# Patient Record
Sex: FEMALE | Race: WHITE | NOT HISPANIC OR LATINO | URBAN - METROPOLITAN AREA
[De-identification: names, ages, dates, MRNs, and addresses within clinical notes are randomized per-mention and may not be internally consistent; named-entity substitution may affect disease eponyms.]

---

## 2017-01-01 ENCOUNTER — HOME CARE/HOSPICE - HEALTHEAST (OUTPATIENT)
Dept: HOME HEALTH SERVICES | Facility: HOME HEALTH | Age: 37
End: 2017-01-01

## 2017-02-08 ENCOUNTER — OFFICE VISIT - HEALTHEAST (OUTPATIENT)
Dept: MIDWIFE SERVICES | Facility: CLINIC | Age: 37
End: 2017-02-08

## 2017-02-08 DIAGNOSIS — E03.9 HYPOTHYROID: ICD-10-CM

## 2017-02-08 ASSESSMENT — MIFFLIN-ST. JEOR: SCORE: 1489.72

## 2017-02-10 ENCOUNTER — AMBULATORY - HEALTHEAST (OUTPATIENT)
Dept: MIDWIFE SERVICES | Facility: CLINIC | Age: 37
End: 2017-02-10

## 2017-02-10 DIAGNOSIS — E03.9 HYPOTHYROID: ICD-10-CM

## 2017-02-13 LAB
HPV INTERPRETATION - HISTORICAL: NORMAL
HPV INTERPRETER - HISTORICAL: NORMAL

## 2017-02-14 LAB
BKR LAB AP ABNORMAL BLEEDING: NO
BKR LAB AP BIRTH CONTROL/HORMONES: NORMAL
BKR LAB AP CERVICAL APPEARANCE: NORMAL
BKR LAB AP GYN ADEQUACY: NORMAL
BKR LAB AP GYN INTERPRETATION: NORMAL
BKR LAB AP HPV REFLEX: NORMAL
BKR LAB AP LMP: NORMAL
BKR LAB AP PATIENT STATUS: NORMAL
BKR LAB AP PREVIOUS ABNORMAL: NO
BKR LAB AP PREVIOUS NORMAL: NORMAL
HIGH RISK?: NO
PATH REPORT.COMMENTS IMP SPEC: NORMAL
RESULT FLAG (HE HISTORICAL CONVERSION): NORMAL

## 2017-02-15 ENCOUNTER — COMMUNICATION - HEALTHEAST (OUTPATIENT)
Dept: MIDWIFE SERVICES | Facility: CLINIC | Age: 37
End: 2017-02-15

## 2017-02-20 ENCOUNTER — AMBULATORY - HEALTHEAST (OUTPATIENT)
Dept: MIDWIFE SERVICES | Facility: CLINIC | Age: 37
End: 2017-02-20

## 2017-02-20 ENCOUNTER — COMMUNICATION - HEALTHEAST (OUTPATIENT)
Dept: ADMINISTRATIVE | Facility: CLINIC | Age: 37
End: 2017-02-20

## 2017-02-20 DIAGNOSIS — E03.9 HYPOTHYROID: ICD-10-CM

## 2017-02-20 DIAGNOSIS — O09.522 SUPERVISION OF HIGH RISK ELDERLY MULTIGRAVIDA IN SECOND TRIMESTER: ICD-10-CM

## 2017-02-20 RX ORDER — LEVOTHYROXINE SODIUM 75 UG/1
75 TABLET ORAL DAILY
Qty: 30 TABLET | Refills: 12 | Status: SHIPPED | OUTPATIENT
Start: 2017-02-20

## 2018-03-28 ENCOUNTER — OFFICE VISIT - HEALTHEAST (OUTPATIENT)
Dept: CARDIOLOGY | Facility: CLINIC | Age: 38
End: 2018-03-28

## 2018-03-28 DIAGNOSIS — I30.0 IDIOPATHIC PERICARDITIS: ICD-10-CM

## 2018-03-28 ASSESSMENT — MIFFLIN-ST. JEOR: SCORE: 1498.22

## 2018-03-29 ENCOUNTER — COMMUNICATION - HEALTHEAST (OUTPATIENT)
Dept: CARDIOLOGY | Facility: CLINIC | Age: 38
End: 2018-03-29

## 2018-03-29 DIAGNOSIS — I30.0 IDIOPATHIC PERICARDITIS: ICD-10-CM

## 2018-03-29 RX ORDER — COLCHICINE 0.6 MG/1
0.6 CAPSULE ORAL 2 TIMES DAILY
Qty: 60 CAPSULE | Refills: 1 | Status: SHIPPED | OUTPATIENT
Start: 2018-03-29

## 2018-04-11 ENCOUNTER — COMMUNICATION - HEALTHEAST (OUTPATIENT)
Dept: CARDIOLOGY | Facility: CLINIC | Age: 38
End: 2018-04-11

## 2021-05-30 VITALS — WEIGHT: 172 LBS | BODY MASS INDEX: 26.07 KG/M2 | HEIGHT: 68 IN

## 2021-06-01 VITALS — BODY MASS INDEX: 26.22 KG/M2 | WEIGHT: 173 LBS | HEIGHT: 68 IN

## 2021-06-08 NOTE — PROGRESS NOTES
INES@Patient ID: Ashley Ferrer is a 37 y.o. female.  Assessment:   Normal postpartum exam at ~6 weeks postpartum.   lactating and bottlefeeding formula    Plan:    1. Pap smear done at today's visit as there were no pap results in her prenatal record from her previous clinic.  Given option to obtain pap--pt accepts.  Due for annual Gyn exam in February 2017.  Pt prefers phone call with results.  2.  Thyroid Hooker today   3. Desired contraception: Natural Family planning.   4. Discussed resumption of exercise and setting a goal to return to pre-pregnancy weight in the next 6-12 months.   5.  Resumption of intercourse reviewed with possible changes in libido and vaginal lubrication while nursing.  6.  Nutrition and supplements reviewed.  Advised continuation of a prenatal or multivitamin, also Vitamin D3 5000 IU geltab daily and an omega 3 fatty acid supplement.  7. Adjustment to parenting, self care and open communication with her support system discussed. Warning signs and symptoms related to postpartum mood disorders reviewed.     Total time spent with patient 40 minutes, >50% counseling, education and coordination of care.    Subjective:     Ashley Ferrer is a 37 y.o. female who presents for postpartum visit. She is 6 weeks postpartum following a NSVB.  I have fully reviewed the prenatal and intrapartum course. The delivery was at 39w6d gestation Her baby boy is named Bret and weighed 9 lbs 5 oz at birth.     Postpartum course has been stable. Baby's course has been stable. Baby is feeding by both breast and bottle.  Lochia ceased at 3 weeks postpartum.  Bowel function is normal. Bladder function is normal. She has not resumed intercourse. Desired contraception: Natural Family Planning. [place] postpartum depression screening score: 3, no on #10, which she feels reflects the recent stress of her household having the stomach virus. She has resumed regular exercise. Patient is a  JOSE Ramirez requests thyroid labs today.  She plans f/u with her primary provider to manage her levothyroxine.      Review of Systems  General:  Denies problem  Eyes: Denies problem  Ears/Nose/Throat: Denies problem  Cardiovascular: Denies problem  Respiratory:  Denies problem  Gastrointestinal:  Denies problem,   Genitourinary: Denies problem  Musculoskeletal:  Denies problem  Skin: Denies problem  Neurologic: Denies problem  Psychiatric: Denies Problem  Endocrine: Denies problem    Objective:         Physical Exam:  General Appearance: Alert, cooperative, no distress, appears stated age  Skin: Skin color, texture, turgor normal, no rashes or lesions  Throat: Lips, mucosa, and tongue normal; teeth and gums normal  HEENT: grossly normal; otoscopic and opthalmic exam not performed.   Neck: Supple, symmetrical, trachea midline, no adenopathy;  thyroid: not enlarged, symmetric, no tenderness/mass/nodules  Lungs: Clear to auscultation bilaterally, respirations unlabored  Breasts: No breast masses, tenderness, asymmetry, or nipple discharge.  Heart: Regular rate and rhythm, S1 and  S2 normal, no murmur, rub, or gallop  Abdomen: Soft, non-tender. DR: 3 FB  Pelvic:External genitalia normal without lesions or irritation. Vagina and cervix show no lesions, inflammation, discharge or tenderness.  Some perineal tenderness expressed with exam. 1st degree perineal laceration well approximated and well healed.   No cystocele, No rectocele. Uterus fully involuted.  No adnexal mass or tenderness.  Extremities: Extremities normal without  Edema; A few spider veins noted on BLE.         Last Pap: unknown. Results were: normal per patient  Immunization History   Administered Date(s) Administered     Influenza, inj, historic 09/23/2014, 09/30/2016     Tdap 05/29/2015, 11/14/2016     Immunization status: up to date and documented

## 2021-06-16 PROBLEM — I30.0 IDIOPATHIC PERICARDITIS: Status: ACTIVE | Noted: 2018-03-28

## 2021-06-17 NOTE — PROGRESS NOTES
"CARDIOLOGY CLINIC CONSULT NOTE     Assessment/Plan:   1. Acute pericarditis.  Some improvement with low-dose Naprosyn.  We discussed the need to more effectively reduce the inflammation to relieve her discomfort.  We will increase the Aleve to 2 tablets twice daily, and add colchicine 0.6 mg twice daily      Follow up as needed     History of Present Illness:     It is my pleasure to see Ashley Ferrer at the [facility] clinic for evaluation of chest pain.  She is accompanied by her .    Ashley Ferrer is a 38 y.o. female with a past medical history of childbirth ×5, with no history of hypertension diabetes mellitus or dyslipidemia, presented 3/23 with pleuritic chest pain to the emergency room.  Troponins were normal and she was discharged to home.  She returned the following day when the pain persisted.  On that occasion there was no evidence of pulmonary embolism.  She was started on Naprosyn but took only one over-the-counter Aleve twice daily.  She has had some improvement in her discomfort.  She is referred by the emergency room to follow-up here.    The pain has gradually improved it was initially more of a sharp \"gas pain\" located in the left upper chest in the axillary region.  Now the discomfort is more of an ache that is still worsened on her left side but otherwise is tolerable.  She has not had to cut back on any other activities.  She has had no fevers or chills or sweats.  Her symptoms continue to improve.    Past Medical History:     Patient Active Problem List   Diagnosis      (normal spontaneous vaginal delivery)     Pregnant     Normal labor       Past Surgical History:     Past Surgical History:   Procedure Laterality Date     WISDOM TOOTH EXTRACTION         Family History:     Family History   Problem Relation Age of Onset     No Medical Problems Mother      Hypertension Father      No Medical Problems Sister      No Medical Problems Brother      No " "Medical Problems Maternal Aunt      No Medical Problems Maternal Uncle      No Medical Problems Paternal Aunt      Diabetes Paternal Uncle      No Medical Problems Maternal Grandmother      No Medical Problems Maternal Grandfather      Early death Paternal Grandmother      in her 40's     No Medical Problems Paternal Grandfather      Family history reviewed and is not pertinent to the chief complaint or presenting problem    Social History:    reports that she has never smoked. She has never used smokeless tobacco. She reports that she does not drink alcohol or use illicit drugs.    Exercise: None other than caring for 5 children    Sleep: Unable to lay on her left side because of the sharp chest pain otherwise restorative.    Meds:     Current Outpatient Prescriptions on File Prior to Visit   Medication Sig Dispense Refill     levothyroxine (SYNTHROID, LEVOTHROID) 75 MCG tablet Take 1 tablet (75 mcg total) by mouth daily. 30 tablet 12     naproxen (NAPROSYN) 375 MG tablet Take 1 tablet (375 mg total) by mouth 2 (two) times a day with meals. 28 tablet 0     prenatal multivit-Ca-min-Fe-FA (PRENATAL VITAMIN) Tab Take 1 tablet by mouth daily.       No current facility-administered medications on file prior to visit.        Allergies:   Amoxicillin and Bactrim [sulfamethoxazole-trimethoprim]    Review of Systems:     General: WNL  Eyes: WNL  Ears/Nose/Throat: WNL  Lungs: WNL  Heart: WNL  Stomach: WNL  Bladder: WNL  Muscle/Joints: WNL  Skin: WNL  Nervous System: WNL  Mental Health: WNL     Blood: WNL        Objective:      Physical Exam  173 lb (78.5 kg)  5' 8\" (1.727 m)  Body mass index is 26.3 kg/(m^2).  /72 (Patient Site: Left Arm, Patient Position: Sitting, Cuff Size: Adult Regular)  Pulse 60  Resp 18  Ht 5' 8\" (1.727 m)  Wt 173 lb (78.5 kg)  BMI 26.3 kg/m2    General Appearance : Awake, Alert, No acute distress  HEENT: No Scleral icterus; the mucous membranes were pink and moist.  Conjunctivae not " injected  Neck:  No cervical bruits, jugular venous distention, or thyromegaly   Chest: The spine was straight mild reproducible chest pain in left axillary region as well as left inframammary region  Lungs: Respirations unlabored; the lungs are clear to auscultation.  No wheezing    Cardiovascular:    Normal point of maximal impulse.  Auscultation reveals normal first and second heart sounds with no murmurs, rubs, or gallops.  Carotid, radial, and dorsalis pedal pulses and intact.    Abdomen: Rounded.  No organomegaly, masses, bruits, or tenderness. Bowels sounds are present  Extremities: No edema  Skin: No xanthelasma. Warm, Dry.  Musculoskeletal: No tenderness.  Neurologic: Alert and oriented ×3.      EKG(personally reviewed):  March 23 and 24, both showed normal sinus rhythm, normal ECG.    Cardiac Imaging Studies:  XR CHEST 2 VIEWS  3/23/2018 10:04 AM  INDICATION: Chest pain  COMPARISON: None.  FINDINGS: Normal heart size. Lung fields are clear. No infiltrate, effusion or pneumothorax.    Lab Review   Lab Results   Component Value Date     03/24/2018    K 3.6 03/24/2018     03/24/2018    CO2 25 03/24/2018    BUN 8 03/24/2018    CREATININE 0.68 03/24/2018    CALCIUM 9.2 03/24/2018     Lab Results   Component Value Date    WBC 5.0 03/24/2018    HGB 13.2 03/24/2018    HCT 38.4 03/24/2018    MCV 84 03/24/2018     03/24/2018     Lab Results   Component Value Date    CHOL 88 05/27/2012    TRIG 38 05/27/2012    HDL 48 05/27/2012    LDLCALC 32 05/27/2012     Lab Results   Component Value Date    TROPONINI <0.01 03/24/2018     No results found for: BNP  Lab Results   Component Value Date    TSH 0.71 02/08/2017       Mannie Chavira MD [facility]    His note created using Dragon voice recognition software. Sound alike errors may have escaped editing.

## 2021-07-03 NOTE — ADDENDUM NOTE
Addendum Note by Linsey Laura APRN, CNM at 2/8/2017  3:48 PM     Author: Linsey Laura APRN, CNM Service: -- Author Type: Midwife    Filed: 2/8/2017  3:48 PM Encounter Date: 2/8/2017 Status: Signed    : Linsey Laura APRN, CNM (Midwife)    Addended by: LINSEY LAURA on: 2/8/2017 03:48 PM        Modules accepted: Orders

## 2021-08-22 ENCOUNTER — HEALTH MAINTENANCE LETTER (OUTPATIENT)
Age: 41
End: 2021-08-22

## 2021-10-17 ENCOUNTER — HEALTH MAINTENANCE LETTER (OUTPATIENT)
Age: 41
End: 2021-10-17

## 2022-10-01 ENCOUNTER — HEALTH MAINTENANCE LETTER (OUTPATIENT)
Age: 42
End: 2022-10-01

## 2023-10-21 ENCOUNTER — HEALTH MAINTENANCE LETTER (OUTPATIENT)
Age: 43
End: 2023-10-21

## 2024-03-03 ENCOUNTER — HEALTH MAINTENANCE LETTER (OUTPATIENT)
Age: 44
End: 2024-03-03